# Patient Record
Sex: MALE | Race: WHITE | NOT HISPANIC OR LATINO | URBAN - METROPOLITAN AREA
[De-identification: names, ages, dates, MRNs, and addresses within clinical notes are randomized per-mention and may not be internally consistent; named-entity substitution may affect disease eponyms.]

---

## 2017-05-04 ENCOUNTER — HISTORICAL (OUTPATIENT)
Dept: ADMINISTRATIVE | Facility: HOSPITAL | Age: 62
End: 2017-05-04

## 2022-04-11 ENCOUNTER — HISTORICAL (OUTPATIENT)
Dept: ADMINISTRATIVE | Facility: HOSPITAL | Age: 67
End: 2022-04-11

## 2022-04-29 VITALS
SYSTOLIC BLOOD PRESSURE: 114 MMHG | BODY MASS INDEX: 39.22 KG/M2 | HEIGHT: 70 IN | DIASTOLIC BLOOD PRESSURE: 70 MMHG | OXYGEN SATURATION: 95 % | WEIGHT: 274 LBS

## 2023-06-27 DIAGNOSIS — I82.409 DEEP VEIN THROMBOSIS: Primary | ICD-10-CM

## 2023-07-21 ENCOUNTER — OFFICE VISIT (OUTPATIENT)
Dept: HEMATOLOGY/ONCOLOGY | Facility: CLINIC | Age: 68
End: 2023-07-21
Payer: MEDICARE

## 2023-07-21 VITALS
OXYGEN SATURATION: 97 % | WEIGHT: 69.81 LBS | SYSTOLIC BLOOD PRESSURE: 151 MMHG | DIASTOLIC BLOOD PRESSURE: 79 MMHG | BODY MASS INDEX: 10 KG/M2 | HEIGHT: 70 IN | TEMPERATURE: 98 F | HEART RATE: 70 BPM | RESPIRATION RATE: 20 BRPM

## 2023-07-21 DIAGNOSIS — D68.59 THROMBOPHILIA: Primary | ICD-10-CM

## 2023-07-21 DIAGNOSIS — I82.409 DEEP VEIN THROMBOSIS: ICD-10-CM

## 2023-07-21 PROCEDURE — 99204 PR OFFICE/OUTPT VISIT, NEW, LEVL IV, 45-59 MIN: ICD-10-PCS | Mod: ,,, | Performed by: INTERNAL MEDICINE

## 2023-07-21 PROCEDURE — 99204 OFFICE O/P NEW MOD 45 MIN: CPT | Mod: ,,, | Performed by: INTERNAL MEDICINE

## 2023-07-21 RX ORDER — APIXABAN 5 MG/1
5 TABLET, FILM COATED ORAL 2 TIMES DAILY
COMMUNITY
Start: 2023-07-12 | End: 2023-11-14

## 2023-07-21 RX ORDER — TAMSULOSIN HYDROCHLORIDE 0.4 MG/1
1 CAPSULE ORAL DAILY
COMMUNITY

## 2023-07-21 RX ORDER — ROSUVASTATIN CALCIUM 20 MG/1
1 TABLET, COATED ORAL DAILY
COMMUNITY

## 2023-07-21 RX ORDER — METFORMIN HYDROCHLORIDE 500 MG/1
1 TABLET ORAL 2 TIMES DAILY
COMMUNITY

## 2023-07-21 RX ORDER — PANTOPRAZOLE SODIUM 40 MG/1
40 TABLET, DELAYED RELEASE ORAL 2 TIMES DAILY
COMMUNITY
Start: 2023-07-15

## 2023-07-21 RX ORDER — IRBESARTAN AND HYDROCHLOROTHIAZIDE 150; 12.5 MG/1; MG/1
1 TABLET, FILM COATED ORAL DAILY
COMMUNITY
Start: 2023-06-26

## 2023-07-21 RX ORDER — UBIDECARENONE 30 MG
30 CAPSULE ORAL 3 TIMES DAILY
COMMUNITY

## 2023-07-21 RX ORDER — EZETIMIBE 10 MG/1
1 TABLET ORAL DAILY
COMMUNITY

## 2023-07-21 NOTE — PROGRESS NOTES
[facility]    CONSULT NOTE                                                  Patient ID: Maurilio Reid is a 67 y.o. male.  MRN: [id number]  : 1955    Subjective:      Chief Complaint: New Patient (Pt c/o pain in left knee due to blood clot  after knee surgery)    History of Present Illness:   This nice man is here with his wife    May 1st he had knee surgery followed soon thereafter with thrombosis in the left lower extremity and bilateral pulmonary emboli    The space in the ICU    He is given Lovenox     The blood testing for thrombophilia was done while he was on anticoagulants    He is feeling good.  He has lost weight in    Is very active person pivot is a nonsmoker.    Other than recovering from a knee surgery he is essentially asymptomatic     His mother may have had blood clots many years ago        History:  History reviewed. No pertinent past medical history.   Past Surgical History:   Procedure Laterality Date    LEG SURGERY      ROTATOR CUFF REPAIR       Family History   Problem Relation Age of Onset    Diverticulitis Mother     Colon cancer Mother     Clotting disorder Mother     Diverticulitis Father     Heart defect Father     Stroke Maternal Grandmother     Brain cancer Paternal Grandfather       Social History     Tobacco Use    Smoking status: Never    Smokeless tobacco: Never   Substance and Sexual Activity    Alcohol use: Not on file     Comment: occassioally    Drug use: Never    Sexual activity: Not on file     Smoking Cessation:  Counseling given: Not Answered     Non-smoker     Allergies:   Review of patient's allergies indicates:   Allergen Reactions    Sulfa (sulfonamide antibiotics) Rash       Current medications:  Medication List with Changes/Refills   Current Medications    CO-ENZYME Q-10 30 MG CAPSULE    Take 30 mg by mouth 3 (three) times daily.    ELIQUIS 5 MG TAB    Take 5 mg by mouth 2 (two) times daily.    EZETIMIBE (ZETIA) 10 MG TABLET    Take 1 tablet by mouth once  daily.    IRBESARTAN-HYDROCHLOROTHIAZIDE (AVALIDE) 150-12.5 MG PER TABLET    Take 1 tablet by mouth Daily.    METFORMIN (GLUCOPHAGE) 500 MG TABLET    Take 1 tablet by mouth 2 (two) times daily.    PANTOPRAZOLE (PROTONIX) 40 MG TABLET    Take 40 mg by mouth 2 (two) times daily.    ROSUVASTATIN (CRESTOR) 20 MG TABLET    Take 1 tablet by mouth once daily.    TAMSULOSIN (FLOMAX) 0.4 MG CAP    Take 1 capsule by mouth once daily.      ROS:   Review of Systems   Constitutional:  Negative for activity change, appetite change, chills, fatigue, fever and unexpected weight change.   HENT:  Negative for nasal congestion, facial swelling, hearing loss, mouth sores, nosebleeds, sore throat, trouble swallowing and voice change.    Eyes:  Negative for photophobia, pain, redness, itching and visual disturbance.   Respiratory:  Negative for apnea, cough, choking, chest tightness, shortness of breath and wheezing.    Cardiovascular:  Negative for chest pain, palpitations, leg swelling and claudication.   Gastrointestinal:  Negative for abdominal distention, abdominal pain, anal bleeding, blood in stool, change in bowel habit, constipation, diarrhea, nausea, rectal pain, vomiting, reflux and change in bowel habit.   Endocrine: Negative for cold intolerance and heat intolerance.   Genitourinary:  Negative for decreased urine volume, difficulty urinating, dysuria, enuresis, flank pain, frequency, hematuria and urgency.   Musculoskeletal:  Negative for arthralgias, back pain, gait problem, joint swelling, leg pain, myalgias and neck pain.   Integumentary:  Negative for color change, rash, wound, mole/lesion, breast mass and breast discharge.   Allergic/Immunologic: Negative for food allergies.   Neurological:  Negative for dizziness, vertigo, tremors, seizures, syncope, facial asymmetry, speech difficulty, weakness, light-headedness, numbness, headaches, coordination difficulties, memory loss and coordination difficulties.  "  Hematological:  Negative for adenopathy. Does not bruise/bleed easily.   Psychiatric/Behavioral:  Negative for agitation, confusion, sleep disturbance and suicidal ideas. The patient is not nervous/anxious.    Breast: Negative for mass  Objective:     Vitals:    07/21/23 1121   BP: (!) 151/79   Pulse: 70   Resp: 20   Temp: 98.1 °F (36.7 °C)   TempSrc: Oral   SpO2: 97%   Weight: 31.7 kg (69 lb 12.8 oz)   Height: 5' 10" (1.778 m)   PainSc:   4   PainLoc: Knee     Physical Examination:   Physical Exam  Vitals and nursing note reviewed.   Constitutional:       Appearance: Normal appearance. He is obese.      Comments: Is at least 60 lb overweight   HENT:      Mouth/Throat:      Mouth: Mucous membranes are moist.      Pharynx: Oropharynx is clear.   Eyes:      Pupils: Pupils are equal, round, and reactive to light.   Cardiovascular:      Rate and Rhythm: Normal rate and regular rhythm.      Pulses: Normal pulses.      Heart sounds: Normal heart sounds.   Pulmonary:      Effort: Pulmonary effort is normal.      Breath sounds: Normal breath sounds.   Abdominal:      General: Abdomen is flat.      Palpations: Abdomen is soft.   Skin:     General: Skin is warm.   Neurological:      General: No focal deficit present.      Mental Status: He is alert and oriented to person, place, and time.   Psychiatric:         Mood and Affect: Mood normal.         Behavior: Behavior normal.         Thought Content: Thought content normal.         Judgment: Judgment normal.     Diagnostic Tests:  Significant Imaging: I have reviewed and interpreted all pertinent imaging results/findings.    Laboratory Data:  All pertinent labs have been reviewed.    Assessment:     1. Deep vein thrombosis     The thrombosis was provoked after knee surgery.  Had a life-threatening blood clot in the pulmonary region was put in the ICU for that.      I still believe he needs anticoagulation for 6 months and since the clot was provoked 1 could safely stop it "     The abnormal lupus anticoagulant is from he taking Eliquis and could be a false positive     All other testing such as prothrombin gene complex factor 5 Leiden protein C protein S were essentially normal inherent tendency for thrombophilia         Orders:   No orders of the defined types were placed in this encounter.    Plan:   Will get a D-dimer done today    Follow-up in November repeat D-dimer    At that time very likely I will stop his Eliquis dpt D-dimers and lupus anticoagulant off the anticoagulants     He needs to lose at least 60 lb of weight.  His ideal weight 180 and 200        Follow Up:   No follow-ups on file.    Plan was discussed with the patient at length, and he verbalized understanding. Maurilio was given an opportunity to ask questions that were answered to his satisfaction, and he was advised to call in the interval if any problems or questions arise.    Thank you for the referral and for allowing me to take care of Maurilio Reid.    Electronically signed by Tyrone Reinoso MD

## 2023-08-16 ENCOUNTER — OFFICE VISIT (OUTPATIENT)
Dept: HEMATOLOGY/ONCOLOGY | Facility: CLINIC | Age: 68
End: 2023-08-16
Payer: MEDICARE

## 2023-08-16 VITALS
WEIGHT: 276 LBS | BODY MASS INDEX: 39.51 KG/M2 | DIASTOLIC BLOOD PRESSURE: 70 MMHG | TEMPERATURE: 98 F | HEIGHT: 70 IN | HEART RATE: 85 BPM | OXYGEN SATURATION: 96 % | SYSTOLIC BLOOD PRESSURE: 134 MMHG | RESPIRATION RATE: 20 BRPM

## 2023-08-16 DIAGNOSIS — I82.402 ACUTE DEEP VEIN THROMBOSIS (DVT) OF LEFT LOWER EXTREMITY, UNSPECIFIED VEIN: Primary | ICD-10-CM

## 2023-08-16 PROCEDURE — 99213 PR OFFICE/OUTPT VISIT, EST, LEVL III, 20-29 MIN: ICD-10-PCS | Mod: ,,, | Performed by: INTERNAL MEDICINE

## 2023-08-16 PROCEDURE — 99213 OFFICE O/P EST LOW 20 MIN: CPT | Mod: ,,, | Performed by: INTERNAL MEDICINE

## 2023-08-16 NOTE — PROGRESS NOTES
[facility]  PROGRESS NOTE    Patient ID: Maurilio Reid is a 67 y.o. male.  MRN: [id number]  : 1955    Subjective:      Chief Complaint: New patient (No Complaints)    History of Present Illness:   Made it unscheduled visit since his cardiologist told him to stop his Eliquis     He has a provoked blood clot     He is otherwise asymptomatic         History:  Past Medical History:   Diagnosis Date    Hypertension       Past Surgical History:   Procedure Laterality Date    LEG SURGERY  1973    ROTATOR CUFF REPAIR       Family History   Problem Relation Age of Onset    Diverticulitis Mother     Colon cancer Mother     Clotting disorder Mother     Diverticulitis Father     Heart defect Father     Stroke Maternal Grandmother     Brain cancer Paternal Grandfather       Social History     Tobacco Use    Smoking status: Never    Smokeless tobacco: Never   Substance and Sexual Activity    Alcohol use: Not on file     Comment: occassioally    Drug use: Never    Sexual activity: Not on file      Allergies:   Review of patient's allergies indicates:   Allergen Reactions    Sulfa (sulfonamide antibiotics) Rash       Current medications:  Medication List with Changes/Refills   Current Medications    CO-ENZYME Q-10 30 MG CAPSULE    Take 30 mg by mouth 3 (three) times daily.    ELIQUIS 5 MG TAB    Take 5 mg by mouth 2 (two) times daily.    EZETIMIBE (ZETIA) 10 MG TABLET    Take 1 tablet by mouth once daily.    IRBESARTAN-HYDROCHLOROTHIAZIDE (AVALIDE) 150-12.5 MG PER TABLET    Take 1 tablet by mouth Daily.    METFORMIN (GLUCOPHAGE) 500 MG TABLET    Take 1 tablet by mouth 2 (two) times daily.    PANTOPRAZOLE (PROTONIX) 40 MG TABLET    Take 40 mg by mouth 2 (two) times daily.    ROSUVASTATIN (CRESTOR) 20 MG TABLET    Take 1 tablet by mouth once daily.    TAMSULOSIN (FLOMAX) 0.4 MG CAP    Take 1 capsule by mouth once daily.      ROS:   Review of Systems  Objective:     Vitals:    23 1422   BP: 134/70   Pulse: 85   Resp:  "20   Temp: 98 °F (36.7 °C)   TempSrc: Oral   SpO2: 96%   Weight: 125.2 kg (276 lb)   Height: 5' 10" (1.778 m)   PainSc:   2   PainLoc: Knee     Wt Readings from Last 2 Encounters:   08/16/23 125.2 kg (276 lb)   07/21/23 31.7 kg (69 lb 12.8 oz)     Physical Examination:   Physical Exam  Diagnostic Tests:  Significant Imaging: I have reviewed and interpreted all pertinent imaging results/findings.    Laboratory Data:  All pertinent labs have been reviewed.    Labs:   No visits with results within 1 Week(s) from this visit.   Latest known visit with results is:   No results found for any previous visit.       Assessment:   Thrombophilia, provoked from knee surgery    Orders:   No orders of the defined types were placed in this encounter.    Plan:   He must continue his Eliquis still D-dimers are negative x2     She D-dimers was still quite elevated 3 weeks ago     He was advised by his Cardiology office safely stop it at this point is too early    He still needs to lose a lot of weight  Follow Up:   No follow-ups on file.    Plan was discussed with the patient at length, and he verbalized understanding. Maurilio was given an opportunity to ask questions that were answered to his satisfaction, and he was advised to call in the interval if any problems or questions arise.    Electronically signed by Tyrone Reinoso MD       "

## 2023-11-13 NOTE — PROGRESS NOTES
[facility]  PROGRESS NOTE    Patient ID: Maurilio Reid is a 68 y.o. male.  MRN: [id number]  : 1955    Subjective:      Chief Complaint: Results and OTHER (Patient had first blood clot in both lungs and one in left leg, after surgery, he is not aware of any family history of blood clots )    History of Present Illness:   Made an appointment to see us because cardiologist told him to stop his Eliquis   He had a blood clot and PE post Knee surgery in May 2023  He is otherwise asymptomatic     Interval history:  Patient is here today 2023 for follow up labs and assessment to evaluate discontinuation of Eliquis post PE and DVT in May 2023 following knee surgery.  D Dimer today <200. Can discontinue Eliquis at this time.    History:  Past Medical History:   Diagnosis Date    History of blood clots     Hypertension       Past Surgical History:   Procedure Laterality Date    LEG SURGERY      ROTATOR CUFF REPAIR       Family History   Problem Relation Age of Onset    Diverticulitis Mother     Colon cancer Mother     Clotting disorder Mother     Diverticulitis Father     Heart defect Father     Stroke Maternal Grandmother     Brain cancer Paternal Grandfather       Social History     Tobacco Use    Smoking status: Never    Smokeless tobacco: Never   Substance and Sexual Activity    Alcohol use: Not on file     Comment: occassioally    Drug use: Never    Sexual activity: Not on file      Allergies:   Review of patient's allergies indicates:   Allergen Reactions    Sulfa (sulfonamide antibiotics) Rash       Current medications:  Medication List with Changes/Refills   Current Medications    CO-ENZYME Q-10 30 MG CAPSULE    Take 30 mg by mouth 3 (three) times daily.    EZETIMIBE (ZETIA) 10 MG TABLET    Take 1 tablet by mouth once daily.    IRBESARTAN-HYDROCHLOROTHIAZIDE (AVALIDE) 150-12.5 MG PER TABLET    Take 1 tablet by mouth Daily.    METFORMIN (GLUCOPHAGE) 500 MG TABLET    Take 1 tablet by mouth 2 (two)  "times daily.    PANTOPRAZOLE (PROTONIX) 40 MG TABLET    Take 40 mg by mouth 2 (two) times daily.    ROSUVASTATIN (CRESTOR) 20 MG TABLET    Take 1 tablet by mouth once daily.    SEMAGLUTIDE (OZEMPIC) 0.25 MG OR 0.5 MG (2 MG/3 ML) PEN INJECTOR    Inject 0.25 mg into the skin every 7 days.    TAMSULOSIN (FLOMAX) 0.4 MG CAP    Take 1 capsule by mouth once daily.   Discontinued Medications    ELIQUIS 5 MG TAB    Take 5 mg by mouth 2 (two) times daily.          Objective:   CONSTITUTIONAL: no fevers, no chills, no weight loss, no fatigue, no weakness  HEMATOLOGIC: no abnormal bleeding, no abnormal bruising, no drenching night sweats  ONCOLOGIC: no new masses or lumps  HEENT: no vision loss, no tinnitus or hearing loss, no nose bleeding, no dysphagia, no odynophagia  CVS: no chest pain, no palpitations, no dyspnea on exertion  RESP: no shortness of breath, no hemoptysis, no cough  GI: no nausea, no vomiting, no diarrhea, no constipation, no melena, no hematochezia, no hematemesis, no abdominal pain, no increase in abdominal girth  : no dysuria, no hematuria, no hesitancy, no scrotal swelling, no discharge  INTEGUMENT: no rashes, no abnormal bruising, no nail pitting, no hyperpigmentation  NEURO: no falls, no memory loss, no paresthesias or dysesthesias, no urofecal incontinence or retention, no loss of strength on any extremity  MSK: no back pain, no joint pain, no joint swelling  PSYCH: no suicidal or homicidal ideation, no depression, no insomnia, no anhedonia  ENDOCRINE: no heat or cold intolerance, no polyuria, no polydipsia        Vitals:    11/14/23 1337   BP: 125/75   Pulse: 74   Resp: 20   Temp: 98 °F (36.7 °C)   TempSrc: Oral   SpO2: 96%   Weight: 128.7 kg (283 lb 11.2 oz)   Height: 5' 10" (1.778 m)       Wt Readings from Last 2 Encounters:   11/14/23 128.7 kg (283 lb 11.2 oz)   08/16/23 125.2 kg (276 lb)     Physical Examination:   GA: AAOx3, NAD  HEENT: NCAT, EOMI,  moist oral mucous membranes  LYMPH: no " cervical, axillary or supraclavicular adenopathy  CVS: s1s2 RRR, no M/R/G   RESP: CTA b/l, no crackles, no wheezes or rhonchi  ABD: soft, NT, ND, BS+, no hepatosplenomegaly  EXT: no deformities, no pedal edema  SKIN: no rashes, warm and dry  NEURO: normal mentation, strength 5/5 on all 4 extremities, no sensory deficits    Laboratory Data:  11/14/23:WBC 14.94, RBC 4.43, Hgb 13, HCT 39.7, , Neut 80.8, Lymph 10.6, Neut 12.07, ANC 12.07 Ddimer <200  07/21/23 D-Dimer 583  06/16/23 CRP 0.30, ESR 15  05/08/23 PT 14.2, PTT 75.4, INR 1.3, wbc 13.77, hgb 8.9, plt 268, ANC 8.70, cr 0.88, alb 2.7, ca 9.2, TB 1.5, LFTs WNL,   05/02/23: Homocysteine 8, Protein S 99, Protein C 87, Antithrombin 86, Cadiolipin antibody IgG <10,  Cadiolipin antibody IgM 12, Factor V Leiden Negative, Prothrombin Variant Negative, Lupus anticoagulant detected.        Assessment:   Thrombophilia, provoked D68.59  Acute Deep Vein Thrombosis (DVT) of left lower extremity, unspecified vein I82.402  History of pulmonary embolism Z86.711  Initial workup for thrombosis noted above.  Homocysteine, Protein S, Protein C, Antithrombin, Cadiolipin antibodyIgG, Cadiolipin antibody IgM, Factor V Leiden, Prothrombin Variant, all Negative  Lupus anticoagulant detected on previous workup from 6/2023.   Following plan implemented per Dr Abilio wilson/ Sudarshan recommended for at least 4 months post DVT and 6 months post PE. Will plan to D/C Eliquis after 6 months of therapy if D dimer is WNL.    Plan:   Labs reviewed, noted D Dimer <200 on 11/14/23  Will discontinue Eliquis at this time.   Will plan repeat Antiphospholipid syndrome profile  Will plan to RTC in  4 weeks with Dr. Foreman to discuss any further treatment options.

## 2023-11-14 ENCOUNTER — OFFICE VISIT (OUTPATIENT)
Dept: HEMATOLOGY/ONCOLOGY | Facility: CLINIC | Age: 68
End: 2023-11-14
Payer: MEDICARE

## 2023-11-14 VITALS
HEART RATE: 74 BPM | SYSTOLIC BLOOD PRESSURE: 125 MMHG | TEMPERATURE: 98 F | BODY MASS INDEX: 40.61 KG/M2 | DIASTOLIC BLOOD PRESSURE: 75 MMHG | OXYGEN SATURATION: 96 % | HEIGHT: 70 IN | WEIGHT: 283.69 LBS | RESPIRATION RATE: 20 BRPM

## 2023-11-14 DIAGNOSIS — I82.402 ACUTE DEEP VEIN THROMBOSIS (DVT) OF LEFT LOWER EXTREMITY, UNSPECIFIED VEIN: ICD-10-CM

## 2023-11-14 DIAGNOSIS — Z86.711 HISTORY OF PULMONARY EMBOLISM: Primary | ICD-10-CM

## 2023-11-14 DIAGNOSIS — D68.59 THROMBOPHILIA: ICD-10-CM

## 2023-11-14 PROCEDURE — 99214 PR OFFICE/OUTPT VISIT, EST, LEVL IV, 30-39 MIN: ICD-10-PCS | Mod: ,,,

## 2023-11-14 PROCEDURE — 99214 OFFICE O/P EST MOD 30 MIN: CPT | Mod: ,,,

## 2023-11-14 RX ORDER — SEMAGLUTIDE 0.68 MG/ML
0.25 INJECTION, SOLUTION SUBCUTANEOUS
COMMUNITY

## 2024-02-02 ENCOUNTER — OFFICE VISIT (OUTPATIENT)
Dept: HEMATOLOGY/ONCOLOGY | Facility: CLINIC | Age: 69
End: 2024-02-02
Payer: MEDICARE

## 2024-02-02 VITALS
RESPIRATION RATE: 18 BRPM | BODY MASS INDEX: 39.44 KG/M2 | OXYGEN SATURATION: 95 % | TEMPERATURE: 98 F | HEIGHT: 70 IN | WEIGHT: 275.5 LBS | SYSTOLIC BLOOD PRESSURE: 127 MMHG | HEART RATE: 76 BPM | DIASTOLIC BLOOD PRESSURE: 71 MMHG

## 2024-02-02 DIAGNOSIS — Z86.711 HISTORY OF PULMONARY EMBOLISM: Primary | ICD-10-CM

## 2024-02-02 PROCEDURE — 99213 OFFICE O/P EST LOW 20 MIN: CPT | Mod: ,,, | Performed by: STUDENT IN AN ORGANIZED HEALTH CARE EDUCATION/TRAINING PROGRAM

## 2024-02-02 NOTE — PROGRESS NOTES
[facility]  PROGRESS NOTE    Patient ID: Maurilio Reid is a 68 y.o. male.  MRN: [id number]  : 1955    Subjective:      Chief Complaint: Follow-up    History of Present Illness:   Made an appointment to see us because cardiologist told him to stop his Eliquis   He had a blood clot and PE post Knee surgery in May 2023  He is otherwise asymptomatic     Interval history:  Today, 2024, patient denies any acute concerns.  He denies any further episodes of lower extremity swelling, shortness of breath, blood in his stools or dark tarry stools.    History:  Past Medical History:   Diagnosis Date    History of blood clots     Hypertension       Past Surgical History:   Procedure Laterality Date    LEG SURGERY      ROTATOR CUFF REPAIR       Family History   Problem Relation Age of Onset    Diverticulitis Mother     Colon cancer Mother     Clotting disorder Mother     Diverticulitis Father     Heart defect Father     Stroke Maternal Grandmother     Brain cancer Paternal Grandfather       Social History     Tobacco Use    Smoking status: Never    Smokeless tobacco: Never   Substance and Sexual Activity    Alcohol use: Not on file     Comment: occassioally    Drug use: Never    Sexual activity: Not on file      Allergies:   Review of patient's allergies indicates:   Allergen Reactions    Sulfa (sulfonamide antibiotics) Rash       Current medications:  Medication List with Changes/Refills   Current Medications    CO-ENZYME Q-10 30 MG CAPSULE    Take 30 mg by mouth 3 (three) times daily.    EZETIMIBE (ZETIA) 10 MG TABLET    Take 1 tablet by mouth once daily.    IRBESARTAN-HYDROCHLOROTHIAZIDE (AVALIDE) 150-12.5 MG PER TABLET    Take 1 tablet by mouth Daily.    METFORMIN (GLUCOPHAGE) 500 MG TABLET    Take 1 tablet by mouth 2 (two) times daily.    PANTOPRAZOLE (PROTONIX) 40 MG TABLET    Take 40 mg by mouth 2 (two) times daily.    ROSUVASTATIN (CRESTOR) 20 MG TABLET    Take 1 tablet by mouth once daily.     "SEMAGLUTIDE (OZEMPIC) 0.25 MG OR 0.5 MG (2 MG/3 ML) PEN INJECTOR    Inject 0.25 mg into the skin every 7 days.    TAMSULOSIN (FLOMAX) 0.4 MG CAP    Take 1 capsule by mouth once daily.          Objective:   CONSTITUTIONAL: no fevers, no chills, no weight loss, no fatigue, no weakness  HEMATOLOGIC: no abnormal bleeding, no abnormal bruising, no drenching night sweats  ONCOLOGIC: no new masses or lumps  HEENT: no vision loss, no tinnitus or hearing loss, no nose bleeding, no dysphagia, no odynophagia  CVS: no chest pain, no palpitations, no dyspnea on exertion  RESP: no shortness of breath, no hemoptysis, no cough  GI: no nausea, no vomiting, no diarrhea, no constipation, no melena, no hematochezia, no hematemesis, no abdominal pain, no increase in abdominal girth  : no dysuria, no hematuria, no hesitancy, no scrotal swelling, no discharge  INTEGUMENT: no rashes, no abnormal bruising, no nail pitting, no hyperpigmentation  NEURO: no falls, no memory loss, no paresthesias or dysesthesias, no urofecal incontinence or retention, no loss of strength on any extremity  MSK: no back pain, no joint pain, no joint swelling  PSYCH: no suicidal or homicidal ideation, no depression, no insomnia, no anhedonia  ENDOCRINE: no heat or cold intolerance, no polyuria, no polydipsia        Vitals:    02/02/24 1038   BP: 127/71   Pulse: 76   Resp: 18   Temp: 98.4 °F (36.9 °C)   TempSrc: Oral   SpO2: 95%   Weight: 125 kg (275 lb 8 oz)   Height: 5' 10" (1.778 m)   PainSc: 0-No pain       Wt Readings from Last 2 Encounters:   02/02/24 125 kg (275 lb 8 oz)   11/14/23 128.7 kg (283 lb 11.2 oz)     Physical Examination:   GA: AAOx3, NAD  HEENT: NCAT, EOMI,  moist oral mucous membranes  LYMPH: no cervical, axillary or supraclavicular adenopathy  CVS: s1s2 RRR, no M/R/G   RESP: CTA b/l, no crackles, no wheezes or rhonchi  ABD: soft, NT, ND, BS+, no hepatosplenomegaly  EXT: no deformities, no pedal edema  SKIN: no rashes, warm and dry  NEURO: " normal mentation, strength 5/5 on all 4 extremities, no sensory deficits    Laboratory Data:  12/15/2023 anticardiolipin IgG IgM, Beta-2 glycoprotein IgG IgM, within normal limits lupus anticoagulant this is not detected.  11/14/23:WBC 14.94, RBC 4.43, Hgb 13, HCT 39.7, , Neut 80.8, Lymph 10.6, Neut 12.07, ANC 12.07 Ddimer <200  07/21/23 D-Dimer 583  06/16/23 CRP 0.30, ESR 15  05/08/23 PT 14.2, PTT 75.4, INR 1.3, wbc 13.77, hgb 8.9, plt 268, ANC 8.70, cr 0.88, alb 2.7, ca 9.2, TB 1.5, LFTs WNL,   05/02/23: Homocysteine 8, Protein S 99, Protein C 87, Antithrombin 86, Cadiolipin antibody IgG <10,  Cadiolipin antibody IgM 12, Factor V Leiden Negative, Prothrombin Variant Negative, Lupus anticoagulant detected.        Assessment:   Thrombophilia, provoked D68.59  Acute Deep Vein Thrombosis (DVT) of left lower extremity, unspecified vein I82.402  History of pulmonary embolism Z86.711  Initial workup for thrombosis noted above.  Homocysteine, Protein S, Protein C, Antithrombin, Cadiolipin antibodyIgG, Cadiolipin antibody IgM, Factor V Leiden, Prothrombin Variant, all Negative  Lupus anticoagulant detected on previous workup from 6/2023.   Following plan implemented per Dr Abilio wilson/ Sudarshan recommended for at least 4 months post DVT and 6 months post PE. Will plan to D/C Eliquis after 6 months of therapy if D dimer is WNL.  D-dimer negative in November 2023, Eliquis discontinued in November 2023 after 6 months of therapy for provoked DVT/PE.  Repeat antiphospholipid syndrome testing negative.    Plan:   Labs reviewed, noted D Dimer <200 on 11/14/23  Eliquis discontinued in November 2023   Follow-up repeat testing for antiphospholipid syndrome negative   Patient can be discharged from our clinic, has close follow-up with PCP.  RTC p.r.n..    A total of  20 minutes were spent in review of records, interpretation of test, coordination of care, discussion and counseling with the patient.        Portions of the record  may have been created with voice recognition software. Occasional wrong-word or sound-a-like substitutions may have occurred due to the inherent limitations of voice recognition software.